# Patient Record
Sex: MALE | Race: BLACK OR AFRICAN AMERICAN | NOT HISPANIC OR LATINO | URBAN - METROPOLITAN AREA
[De-identification: names, ages, dates, MRNs, and addresses within clinical notes are randomized per-mention and may not be internally consistent; named-entity substitution may affect disease eponyms.]

---

## 2021-04-10 ENCOUNTER — INPATIENT (INPATIENT)
Facility: HOSPITAL | Age: 81
LOS: 2 days | Discharge: HOME | DRG: 70 | End: 2021-04-13
Attending: INTERNAL MEDICINE | Admitting: INTERNAL MEDICINE
Payer: MEDICARE

## 2021-04-10 VITALS
TEMPERATURE: 98 F | RESPIRATION RATE: 18 BRPM | HEART RATE: 81 BPM | WEIGHT: 125 LBS | DIASTOLIC BLOOD PRESSURE: 86 MMHG | HEIGHT: 65 IN | SYSTOLIC BLOOD PRESSURE: 136 MMHG

## 2021-04-10 DIAGNOSIS — D49.6 NEOPLASM OF UNSPECIFIED BEHAVIOR OF BRAIN: ICD-10-CM

## 2021-04-10 LAB
ALBUMIN SERPL ELPH-MCNC: 3.3 G/DL — SIGNIFICANT CHANGE UP (ref 3.3–5)
ALP SERPL-CCNC: 133 U/L — HIGH (ref 40–120)
ALT FLD-CCNC: 6 U/L — LOW (ref 10–45)
ANION GAP SERPL CALC-SCNC: 11 MMOL/L — SIGNIFICANT CHANGE UP (ref 5–17)
APPEARANCE UR: CLEAR — SIGNIFICANT CHANGE UP
APTT BLD: 32.5 SEC — SIGNIFICANT CHANGE UP (ref 27.5–35.5)
AST SERPL-CCNC: 19 U/L — SIGNIFICANT CHANGE UP (ref 10–40)
BACTERIA # UR AUTO: NEGATIVE — SIGNIFICANT CHANGE UP
BASOPHILS # BLD AUTO: 0.01 K/UL — SIGNIFICANT CHANGE UP (ref 0–0.2)
BASOPHILS NFR BLD AUTO: 0.1 % — SIGNIFICANT CHANGE UP (ref 0–2)
BILIRUB SERPL-MCNC: 0.2 MG/DL — SIGNIFICANT CHANGE UP (ref 0.2–1.2)
BILIRUB UR-MCNC: NEGATIVE — SIGNIFICANT CHANGE UP
BLD GP AB SCN SERPL QL: NEGATIVE — SIGNIFICANT CHANGE UP
BUN SERPL-MCNC: 23 MG/DL — SIGNIFICANT CHANGE UP (ref 7–23)
CALCIUM SERPL-MCNC: 9.1 MG/DL — SIGNIFICANT CHANGE UP (ref 8.4–10.5)
CHLORIDE SERPL-SCNC: 104 MMOL/L — SIGNIFICANT CHANGE UP (ref 96–108)
CO2 SERPL-SCNC: 23 MMOL/L — SIGNIFICANT CHANGE UP (ref 22–31)
COLOR SPEC: COLORLESS — SIGNIFICANT CHANGE UP
CREAT SERPL-MCNC: 1.11 MG/DL — SIGNIFICANT CHANGE UP (ref 0.5–1.3)
DIFF PNL FLD: ABNORMAL
EOSINOPHIL # BLD AUTO: 0.06 K/UL — SIGNIFICANT CHANGE UP (ref 0–0.5)
EOSINOPHIL NFR BLD AUTO: 0.8 % — SIGNIFICANT CHANGE UP (ref 0–6)
EPI CELLS # UR: 1 /HPF — SIGNIFICANT CHANGE UP
ETHANOL SERPL-MCNC: SIGNIFICANT CHANGE UP MG/DL (ref 0–10)
GLUCOSE SERPL-MCNC: 111 MG/DL — HIGH (ref 70–99)
GLUCOSE UR QL: NEGATIVE — SIGNIFICANT CHANGE UP
HCT VFR BLD CALC: 35.2 % — LOW (ref 39–50)
HGB BLD-MCNC: 10.7 G/DL — LOW (ref 13–17)
HYALINE CASTS # UR AUTO: 0 /LPF — SIGNIFICANT CHANGE UP (ref 0–2)
IMM GRANULOCYTES NFR BLD AUTO: 0.5 % — SIGNIFICANT CHANGE UP (ref 0–1.5)
INR BLD: 1.04 RATIO — SIGNIFICANT CHANGE UP (ref 0.88–1.16)
KETONES UR-MCNC: NEGATIVE — SIGNIFICANT CHANGE UP
LEUKOCYTE ESTERASE UR-ACNC: NEGATIVE — SIGNIFICANT CHANGE UP
LYMPHOCYTES # BLD AUTO: 2.37 K/UL — SIGNIFICANT CHANGE UP (ref 1–3.3)
LYMPHOCYTES # BLD AUTO: 31.6 % — SIGNIFICANT CHANGE UP (ref 13–44)
MCHC RBC-ENTMCNC: 28.4 PG — SIGNIFICANT CHANGE UP (ref 27–34)
MCHC RBC-ENTMCNC: 30.4 GM/DL — LOW (ref 32–36)
MCV RBC AUTO: 93.4 FL — SIGNIFICANT CHANGE UP (ref 80–100)
MONOCYTES # BLD AUTO: 0.58 K/UL — SIGNIFICANT CHANGE UP (ref 0–0.9)
MONOCYTES NFR BLD AUTO: 7.7 % — SIGNIFICANT CHANGE UP (ref 2–14)
NEUTROPHILS # BLD AUTO: 4.45 K/UL — SIGNIFICANT CHANGE UP (ref 1.8–7.4)
NEUTROPHILS NFR BLD AUTO: 59.3 % — SIGNIFICANT CHANGE UP (ref 43–77)
NITRITE UR-MCNC: NEGATIVE — SIGNIFICANT CHANGE UP
NRBC # BLD: 0 /100 WBCS — SIGNIFICANT CHANGE UP (ref 0–0)
PCP SPEC-MCNC: SIGNIFICANT CHANGE UP
PH UR: 7 — SIGNIFICANT CHANGE UP (ref 5–8)
PLATELET # BLD AUTO: 218 K/UL — SIGNIFICANT CHANGE UP (ref 150–400)
POTASSIUM SERPL-MCNC: 4.8 MMOL/L — SIGNIFICANT CHANGE UP (ref 3.5–5.3)
POTASSIUM SERPL-SCNC: 4.8 MMOL/L — SIGNIFICANT CHANGE UP (ref 3.5–5.3)
PROT SERPL-MCNC: 7.6 G/DL — SIGNIFICANT CHANGE UP (ref 6–8.3)
PROT UR-MCNC: NEGATIVE — SIGNIFICANT CHANGE UP
PROTHROM AB SERPL-ACNC: 12.4 SEC — SIGNIFICANT CHANGE UP (ref 10.6–13.6)
RBC # BLD: 3.77 M/UL — LOW (ref 4.2–5.8)
RBC # FLD: 14.6 % — HIGH (ref 10.3–14.5)
RBC CASTS # UR COMP ASSIST: 4 /HPF — SIGNIFICANT CHANGE UP (ref 0–4)
RH IG SCN BLD-IMP: POSITIVE — SIGNIFICANT CHANGE UP
SARS-COV-2 RNA SPEC QL NAA+PROBE: SIGNIFICANT CHANGE UP
SODIUM SERPL-SCNC: 138 MMOL/L — SIGNIFICANT CHANGE UP (ref 135–145)
SP GR SPEC: 1.02 — SIGNIFICANT CHANGE UP (ref 1.01–1.02)
UROBILINOGEN FLD QL: NEGATIVE — SIGNIFICANT CHANGE UP
WBC # BLD: 7.51 K/UL — SIGNIFICANT CHANGE UP (ref 3.8–10.5)
WBC # FLD AUTO: 7.51 K/UL — SIGNIFICANT CHANGE UP (ref 3.8–10.5)
WBC UR QL: 4 /HPF — SIGNIFICANT CHANGE UP (ref 0–5)

## 2021-04-10 PROCEDURE — 99285 EMERGENCY DEPT VISIT HI MDM: CPT

## 2021-04-10 PROCEDURE — 71260 CT THORAX DX C+: CPT | Mod: 26,MA

## 2021-04-10 PROCEDURE — 74177 CT ABD & PELVIS W/CONTRAST: CPT | Mod: 26,MA

## 2021-04-10 RX ORDER — LEVETIRACETAM 250 MG/1
500 TABLET, FILM COATED ORAL
Refills: 0 | Status: DISCONTINUED | OUTPATIENT
Start: 2021-04-10 | End: 2021-04-13

## 2021-04-10 RX ORDER — SODIUM CHLORIDE 9 MG/ML
1000 INJECTION INTRAMUSCULAR; INTRAVENOUS; SUBCUTANEOUS ONCE
Refills: 0 | Status: COMPLETED | OUTPATIENT
Start: 2021-04-10 | End: 2021-04-10

## 2021-04-10 RX ORDER — PANTOPRAZOLE SODIUM 20 MG/1
40 TABLET, DELAYED RELEASE ORAL
Refills: 0 | Status: DISCONTINUED | OUTPATIENT
Start: 2021-04-10 | End: 2021-04-13

## 2021-04-10 RX ORDER — DEXAMETHASONE 0.5 MG/5ML
4 ELIXIR ORAL EVERY 6 HOURS
Refills: 0 | Status: DISCONTINUED | OUTPATIENT
Start: 2021-04-10 | End: 2021-04-10

## 2021-04-10 RX ORDER — DEXAMETHASONE 0.5 MG/5ML
4 ELIXIR ORAL EVERY 6 HOURS
Refills: 0 | Status: DISCONTINUED | OUTPATIENT
Start: 2021-04-10 | End: 2021-04-13

## 2021-04-10 RX ADMIN — Medication 4 MILLIGRAM(S): at 17:56

## 2021-04-10 RX ADMIN — SODIUM CHLORIDE 1000 MILLILITER(S): 9 INJECTION INTRAMUSCULAR; INTRAVENOUS; SUBCUTANEOUS at 10:11

## 2021-04-10 RX ADMIN — LEVETIRACETAM 500 MILLIGRAM(S): 250 TABLET, FILM COATED ORAL at 17:56

## 2021-04-10 NOTE — ED PROVIDER NOTE - ATTENDING CONTRIBUTION TO CARE
Attending MD Layla Correa:  I personally have seen and examined this patient.  Resident note reviewed and agree on plan of care and except where noted.  See HPI, PE, and MDM for details.

## 2021-04-10 NOTE — ED PROVIDER NOTE - PROGRESS NOTE DETAILS
Attending Layla Correa: d/w neurosurgery will come to evaluate [place]: patient with Lung lesion on CT concerning for metastatic malignancy.   NeuroSx recommending medicine admission for malignancy w/u-admitted to Dr. Higgins. Attending Layla Correa: ct scans concrning for malignancy, d/w neurosurgery will admit to medicine

## 2021-04-10 NOTE — ED PROVIDER NOTE - CLINICAL SUMMARY MEDICAL DECISION MAKING FREE TEXT BOX
Attending Layla Correa: 81 y/o male transferred from [facility] for concern for frontal mass with edema and shift. received keppra and decdron prior to arrival. upon arrival to the ed pt awake and alert moving extremities. will d/w neurosurgery. malignancy work up.

## 2021-04-10 NOTE — CONSULT NOTE ADULT - ASSESSMENT
FERMINALISONENDER    79YO M no PMHx (does not see doctors), presents as xfer after first time GTC [place] a [place], after was post-ictal with lip biting per report. Got dex and keppra. CTH uploaded from OSH - L frontal approx 3cm mass with extensive vasogenic edema and 2-3mm MLS. 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 Exam: EO, AOx2, FCx4, difficulty with complex commands and object naming, EOMI, no facial, FLORES 5/5, no drift.   - CT CAP+C pending  - continue neuro checks  - basic labs, tox screen and BAL pending  - Continue keppra 500 BID  - hold further steroids pending imaging given improvement in exam  - dispo pending CT CAP r/o metastatic disease  - Will need MRI wwo contrast to evaluate lesion

## 2021-04-10 NOTE — CHART NOTE - NSCHARTNOTEFT_GEN_A_CORE
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 depending on prognosis and GOC per family, can consider SRS to L frontal brain met pending MRI imaging.  - continue q4h neuro checks  - basic labs wnl, tox screen and BAL neg  - Continue keppra 500 BID  - Can start dex 4q6  - Will need MRI wwo contrast to evaluate lesion

## 2021-04-10 NOTE — ED ADULT NURSE NOTE - OBJECTIVE STATEMENT
80 yr old male pt with no PMH BIBEMS from [facility] for a known brain mass with midline shift from a CT of the brain. Pt was out at a casino and around 3 AM had a 3-4 min witnessed tonic/clonic seizure. 80 yr old male pt with no PMH BIBEMS from [facility] for a known brain mass with midline shift from a CT of the brain. Pt was out at a casino and around 3 AM had a 3-4 min witnessed tonic/clonic seizure. EMS states that they gave dex, haldol and Keppra at [place]. Pt denies any SOB, fever, chills, n/v/d, CP or trouble urinating at current time. Pt oriented to self and place, but disoriented to year. VSS. Pts neuro intact. B/L upper and lower extremities strong. B/L pupils 2mm and reactive. Pt passed his Dysphagia screen. Pts skin intact and normal for race. Lung sounds clear b/l. Abd soft to touch with normoactive bowel sounds in all four quadrants. PIv placed. labs sent. Pt placed on monitor. Safety maintained. Will continue to monitor

## 2021-04-10 NOTE — ED PROVIDER NOTE - OBJECTIVE STATEMENT
81 y/o M no sig PMH presenting from OSH w CNS lesion.    Patient had witnessed seizure at a casino lasting approximately 3 min w GTC activity. As per EMS, patietn was post-ictal subsequently with lip biting.   CTH at OSH showing 3 cm mass with surrounding edema and midline shift.   Denies any N/V, headache or vision changes.

## 2021-04-10 NOTE — CONSULT NOTE ADULT - SUBJECTIVE AND OBJECTIVE BOX
p ([id number])     HPI:  79 y/o M no sig PMH presenting from OSH w CNS lesion.  	Patient had witnessed seizure at a casino lasting approximately 3 min w GTC activity. As per EMS, harisn was post-ictal subsequently with lip biting.   CTH at OSH showing 3 cm mass with surrounding edema and midline shift. S/p decadron and keppra.     Imaging:  CTH uploaded from OSH - L frontal approx 3cm mass with extensive vasogenic edema and 2-3mm MLS    Exam:  EO, AOx2, FCx4, difficulty with complex commands and object naming, EOMI, no facial, FLORES 5/5, no drift.     --Anticoagulation:    =====================  PAST MEDICAL HISTORY   No pertinent past medical history      PAST SURGICAL HISTORY   No significant past surgical history          MEDICATIONS:  Antibiotics:    Neuro:    Other:      SOCIAL HISTORY:   Occupation:   Marital Status:     FAMILY HISTORY:      ROS: Negative except per HPI    LABS:

## 2021-04-10 NOTE — ED ADULT NURSE NOTE - NSIMPLEMENTINTERV_GEN_ALL_ED
Implemented All Universal Safety Interventions:  [place] to call system. Call bell, personal items and telephone within reach. Instruct patient to call for assistance. Room bathroom lighting operational. Non-slip footwear when patient is off stretcher. Physically safe environment: no spills, clutter or unnecessary equipment. Stretcher in lowest position, wheels locked, appropriate side rails in place.

## 2021-04-10 NOTE — H&P ADULT - ASSESSMENT
The patient is a 80y Male was transfer because of brain mass.    Brain mass:  ? Mets  Neuro Sx eval appreciated  Keppra  IV Decadron    Lung mass:    IR for biopsy    Will discuss with family for GOC The patient is a 80y Male was transfer because of brain mass.    Brain mass:  ? Mets  Neuro Sx eval appreciated  Keppra  IV Decadron  MRI brain pending    Lung mass:    IR for biopsy    Will discuss with family for GOC

## 2021-04-10 NOTE — H&P ADULT - HISTORY OF PRESENT ILLNESS
79 y/o M no sig PMH presenting from OSH w CNS lesion.  Patient had witnessed seizure at a casino lasting approximately 3 min w GTC activity. As per EMS, patient  was post-ictal subsequently with lip biting.   CTH at OSH showing 3 cm mass with surrounding edema and midline shift.   Denies any N/V, headache or vision changes.

## 2021-04-10 NOTE — ED PROVIDER NOTE - PHYSICAL EXAMINATION
Attending Layla Correa: Gen: NAD, heent: atrauamtic, eomi, perrla, mmm, op pink, uvula midline, neck; nttp, no nuchal rigidity, chest: nttp, no crepitus, cv: rrr, no murmurs, lungs: ctab, abd: soft, nontender, nondistended, no peritoneal signs, +BS, no guarding, ext: wwp, neg homans, skin: no rash, neuro: awake and alert, following commands, speech clear, sensation and strength intact, no focal deficits

## 2021-04-11 LAB
ANION GAP SERPL CALC-SCNC: 12 MMOL/L — SIGNIFICANT CHANGE UP (ref 5–17)
BUN SERPL-MCNC: 16 MG/DL — SIGNIFICANT CHANGE UP (ref 7–23)
CALCIUM SERPL-MCNC: 8.4 MG/DL — SIGNIFICANT CHANGE UP (ref 8.4–10.5)
CHLORIDE SERPL-SCNC: 101 MMOL/L — SIGNIFICANT CHANGE UP (ref 96–108)
CO2 SERPL-SCNC: 24 MMOL/L — SIGNIFICANT CHANGE UP (ref 22–31)
COVID-19 SPIKE DOMAIN AB INTERP: NEGATIVE — SIGNIFICANT CHANGE UP
COVID-19 SPIKE DOMAIN ANTIBODY RESULT: 0.4 U/ML — SIGNIFICANT CHANGE UP
CREAT SERPL-MCNC: 0.79 MG/DL — SIGNIFICANT CHANGE UP (ref 0.5–1.3)
GLUCOSE SERPL-MCNC: 113 MG/DL — HIGH (ref 70–99)
HCT VFR BLD CALC: 38.6 % — LOW (ref 39–50)
HGB BLD-MCNC: 11.9 G/DL — LOW (ref 13–17)
MCHC RBC-ENTMCNC: 28.4 PG — SIGNIFICANT CHANGE UP (ref 27–34)
MCHC RBC-ENTMCNC: 30.8 GM/DL — LOW (ref 32–36)
MCV RBC AUTO: 92.1 FL — SIGNIFICANT CHANGE UP (ref 80–100)
NRBC # BLD: 0 /100 WBCS — SIGNIFICANT CHANGE UP (ref 0–0)
PLATELET # BLD AUTO: 267 K/UL — SIGNIFICANT CHANGE UP (ref 150–400)
POTASSIUM SERPL-MCNC: 4.3 MMOL/L — SIGNIFICANT CHANGE UP (ref 3.5–5.3)
POTASSIUM SERPL-SCNC: 4.3 MMOL/L — SIGNIFICANT CHANGE UP (ref 3.5–5.3)
RBC # BLD: 4.19 M/UL — LOW (ref 4.2–5.8)
RBC # FLD: 14.4 % — SIGNIFICANT CHANGE UP (ref 10.3–14.5)
SARS-COV-2 IGG+IGM SERPL QL IA: 0.4 U/ML — SIGNIFICANT CHANGE UP
SARS-COV-2 IGG+IGM SERPL QL IA: NEGATIVE — SIGNIFICANT CHANGE UP
SODIUM SERPL-SCNC: 137 MMOL/L — SIGNIFICANT CHANGE UP (ref 135–145)
WBC # BLD: 9.94 K/UL — SIGNIFICANT CHANGE UP (ref 3.8–10.5)
WBC # FLD AUTO: 9.94 K/UL — SIGNIFICANT CHANGE UP (ref 3.8–10.5)

## 2021-04-11 RX ADMIN — Medication 4 MILLIGRAM(S): at 06:15

## 2021-04-11 RX ADMIN — PANTOPRAZOLE SODIUM 40 MILLIGRAM(S): 20 TABLET, DELAYED RELEASE ORAL at 06:15

## 2021-04-11 RX ADMIN — LEVETIRACETAM 500 MILLIGRAM(S): 250 TABLET, FILM COATED ORAL at 17:31

## 2021-04-11 RX ADMIN — LEVETIRACETAM 500 MILLIGRAM(S): 250 TABLET, FILM COATED ORAL at 06:15

## 2021-04-11 RX ADMIN — Medication 4 MILLIGRAM(S): at 00:28

## 2021-04-12 LAB
ALBUMIN SERPL ELPH-MCNC: 3.5 G/DL — SIGNIFICANT CHANGE UP (ref 3.3–5)
ALP SERPL-CCNC: 129 U/L — HIGH (ref 40–120)
ALT FLD-CCNC: 6 U/L — LOW (ref 10–45)
ANION GAP SERPL CALC-SCNC: 12 MMOL/L — SIGNIFICANT CHANGE UP (ref 5–17)
AST SERPL-CCNC: 16 U/L — SIGNIFICANT CHANGE UP (ref 10–40)
BILIRUB SERPL-MCNC: 0.2 MG/DL — SIGNIFICANT CHANGE UP (ref 0.2–1.2)
BUN SERPL-MCNC: 29 MG/DL — HIGH (ref 7–23)
CALCIUM SERPL-MCNC: 9.6 MG/DL — SIGNIFICANT CHANGE UP (ref 8.4–10.5)
CHLORIDE SERPL-SCNC: 101 MMOL/L — SIGNIFICANT CHANGE UP (ref 96–108)
CO2 SERPL-SCNC: 27 MMOL/L — SIGNIFICANT CHANGE UP (ref 22–31)
CREAT SERPL-MCNC: 1.07 MG/DL — SIGNIFICANT CHANGE UP (ref 0.5–1.3)
GLUCOSE SERPL-MCNC: 115 MG/DL — HIGH (ref 70–99)
HCT VFR BLD CALC: 38.7 % — LOW (ref 39–50)
HGB BLD-MCNC: 11.9 G/DL — LOW (ref 13–17)
MCHC RBC-ENTMCNC: 28.3 PG — SIGNIFICANT CHANGE UP (ref 27–34)
MCHC RBC-ENTMCNC: 30.7 GM/DL — LOW (ref 32–36)
MCV RBC AUTO: 91.9 FL — SIGNIFICANT CHANGE UP (ref 80–100)
NRBC # BLD: 0 /100 WBCS — SIGNIFICANT CHANGE UP (ref 0–0)
PLATELET # BLD AUTO: 270 K/UL — SIGNIFICANT CHANGE UP (ref 150–400)
POTASSIUM SERPL-MCNC: 4.3 MMOL/L — SIGNIFICANT CHANGE UP (ref 3.5–5.3)
POTASSIUM SERPL-SCNC: 4.3 MMOL/L — SIGNIFICANT CHANGE UP (ref 3.5–5.3)
PROT SERPL-MCNC: 8 G/DL — SIGNIFICANT CHANGE UP (ref 6–8.3)
RBC # BLD: 4.21 M/UL — SIGNIFICANT CHANGE UP (ref 4.2–5.8)
RBC # FLD: 14.4 % — SIGNIFICANT CHANGE UP (ref 10.3–14.5)
SODIUM SERPL-SCNC: 140 MMOL/L — SIGNIFICANT CHANGE UP (ref 135–145)
WBC # BLD: 12.38 K/UL — HIGH (ref 3.8–10.5)
WBC # FLD AUTO: 12.38 K/UL — HIGH (ref 3.8–10.5)

## 2021-04-12 RX ADMIN — Medication 4 MILLIGRAM(S): at 06:25

## 2021-04-12 RX ADMIN — Medication 4 MILLIGRAM(S): at 17:23

## 2021-04-12 RX ADMIN — Medication 4 MILLIGRAM(S): at 00:18

## 2021-04-12 RX ADMIN — LEVETIRACETAM 500 MILLIGRAM(S): 250 TABLET, FILM COATED ORAL at 17:22

## 2021-04-12 RX ADMIN — LEVETIRACETAM 500 MILLIGRAM(S): 250 TABLET, FILM COATED ORAL at 06:24

## 2021-04-12 RX ADMIN — PANTOPRAZOLE SODIUM 40 MILLIGRAM(S): 20 TABLET, DELAYED RELEASE ORAL at 06:25

## 2021-04-12 NOTE — PROGRESS NOTE ADULT - SUBJECTIVE AND OBJECTIVE BOX
Patient is a 80y old  Male who presents with a chief complaint of The patient is a 80y Male was transfer because of brain mass. (2021 08:11)      SUBJECTIVE / OVERNIGHT EVENTS:    Events noted.  CONSTITUTIONAL: Refused work up for brain/lung mass  RESPIRATORY: No cough, wheezing,  No shortness of breath  CARDIOVASCULAR: No chest pain, palpitations, dizziness, or leg swelling  GASTROINTESTINAL: No abdominal or epigastric pain. No nausea, vomiting.  NEUROLOGICAL: No headaches,     MEDICATIONS  (STANDING):  dexAMETHasone  Injectable 4 milliGRAM(s) IV Push every 6 hours  levETIRAcetam 500 milliGRAM(s) Oral two times a day  pantoprazole    Tablet 40 milliGRAM(s) Oral before breakfast    MEDICATIONS  (PRN):        CAPILLARY BLOOD GLUCOSE        I&O's Summary    10 Apr 2021 07:  -  2021 07:00  --------------------------------------------------------  IN: 360 mL / OUT: 300 mL / NET: 60 mL    2021 07:01  -  2021 23:58  --------------------------------------------------------  IN: 240 mL / OUT: 0 mL / NET: 240 mL        T(C): 36.6 (21 @ 20:06), Max: 36.7 (21 @ 04:21)  HR: 68 (21 @ 20:06) (68 - 82)  BP: 128/87 (21 @ 20:06) (128/87 - 138/89)  RR: 18 (21 @ 20:06) (17 - 18)  SpO2: 96% (21 @ 20:06) (94% - 96%)    PHYSICAL EXAM:    NECK: Supple, No JVD  CHEST/LUNG: Clear to auscultation bilaterally; No wheezing.  HEART: Regular rate and rhythm; No murmurs, rubs, or gallops  ABDOMEN: Soft, Nontender, Nondistended; Bowel sounds present  EXTREMITIES:   No edema  NEUROLOGY: AAO       LABS:                        11.9   9.94  )-----------( 267      ( 2021 07:56 )             38.6     04    137  |  101  |  16  ----------------------------<  113<H>  4.3   |  24  |  0.79    Ca    8.4      2021 07:50    TPro  7.6  /  Alb  3.3  /  TBili  0.2  /  DBili  x   /  AST  19  /  ALT  6<L>  /  AlkPhos  133<H>  04-10    PT/INR - ( 10 Apr 2021 08:31 )   PT: 12.4 sec;   INR: 1.04 ratio         PTT - ( 10 Apr 2021 08:31 )  PTT:32.5 sec      Urinalysis Basic - ( 10 Apr 2021 10:18 )    Color: Colorless / Appearance: Clear / S.025 / pH: x  Gluc: x / Ketone: Negative  / Bili: Negative / Urobili: Negative   Blood: x / Protein: Negative / Nitrite: Negative   Leuk Esterase: Negative / RBC: 4 /hpf / WBC 4 /HPF   Sq Epi: x / Non Sq Epi: 1 /hpf / Bacteria: Negative      CAPILLARY BLOOD GLUCOSE            RADIOLOGY & ADDITIONAL TESTS:    Imaging Personally Reviewed:    Consultant(s) Notes Reviewed:      Care Discussed with Consultants/Other Providers:    Steve Higgins MD, CMD, FACP    [address] [place] [address]  Office Tel: [phone]  Cell: [phone]  
Patient is a 80y old  Male who presents with a chief complaint of The patient is a 80y Male was transfer because of brain mass. (12 Apr 2021 05:28)      SUBJECTIVE / OVERNIGHT EVENTS:    Events noted.  CONSTITUTIONAL: No fever,  or fatigue  RESPIRATORY: No cough, wheezing,  No shortness of breath  CARDIOVASCULAR: No chest pain, palpitations, dizziness, or leg swelling  GASTROINTESTINAL: No abdominal or epigastric pain. No nausea, vomiting.  NEUROLOGICAL: No headaches,     MEDICATIONS  (STANDING):  dexAMETHasone  Injectable 4 milliGRAM(s) IV Push every 6 hours  levETIRAcetam 500 milliGRAM(s) Oral two times a day  pantoprazole    Tablet 40 milliGRAM(s) Oral before breakfast    MEDICATIONS  (PRN):        CAPILLARY BLOOD GLUCOSE        I&O's Summary    11 Apr 2021 07:01  -  12 Apr 2021 07:00  --------------------------------------------------------  IN: 240 mL / OUT: 0 mL / NET: 240 mL    12 Apr 2021 07:01  -  13 Apr 2021 00:44  --------------------------------------------------------  IN: 640 mL / OUT: 0 mL / NET: 640 mL        T(C): 36.6 (04-12-21 @ 21:46), Max: 36.6 (04-12-21 @ 21:46)  HR: 68 (04-12-21 @ 21:46) (68 - 72)  BP: 141/90 (04-12-21 @ 21:46) (127/81 - 145/97)  RR: 18 (04-12-21 @ 21:46) (18 - 18)  SpO2: 96% (04-12-21 @ 21:46) (96% - 97%)    PHYSICAL EXAM:  GENERAL: NAD  NECK: Supple, No JVD  CHEST/LUNG: Clear to auscultation bilaterally; No wheezing.  HEART: Regular rate and rhythm; No murmurs, rubs, or gallops  ABDOMEN: Soft, Nontender, Nondistended; Bowel sounds present  EXTREMITIES:   No edema  NEUROLOGY: AAO X 3      LABS:                        11.9   12.38 )-----------( 270      ( 12 Apr 2021 07:44 )             38.7     04-12    140  |  101  |  29<H>  ----------------------------<  115<H>  4.3   |  27  |  1.07    Ca    9.6      12 Apr 2021 07:44    TPro  8.0  /  Alb  3.5  /  TBili  0.2  /  DBili  x   /  AST  16  /  ALT  6<L>  /  AlkPhos  129<H>  04-12            CAPILLARY BLOOD GLUCOSE            RADIOLOGY & ADDITIONAL TESTS:    Imaging Personally Reviewed:    Consultant(s) Notes Reviewed:      Care Discussed with Consultants/Other Providers:    Steve Higgins MD, CMD, FACP    [address] [place] [address]  Office Tel: [phone]  Cell: [phone]  
Patient seen and examined at bedside.    --Anticoagulation--    T(C): 36.7 (04-11-21 @ 04:21), Max: 36.7 (04-10-21 @ 11:50)  HR: 75 (04-11-21 @ 04:21) (60 - 87)  BP: 138/89 (04-11-21 @ 04:21) (114/73 - 143/85)  RR: 18 (04-11-21 @ 04:21) (18 - 18)  SpO2: 94% (04-11-21 @ 04:21) (94% - 100%)  Wt(kg): --    Exam:  EO, AOx2, FCx4, difficulty with complex commands and object naming, EOMI, no facial, FLORES 5/5, no drift
Patient seen and examined at bedside.    --Anticoagulation--    T(C): 36.4 (04-12-21 @ 05:09), Max: 36.7 (04-11-21 @ 13:12)  HR: 72 (04-12-21 @ 05:09) (68 - 82)  BP: 127/81 (04-12-21 @ 05:09) (127/81 - 132/76)  RR: 18 (04-12-21 @ 05:09) (17 - 18)  SpO2: 97% (04-12-21 @ 05:09) (94% - 97%)  Wt(kg): --    Exam:  EO, AOx2, FCx4, difficulty with complex commands and object naming, EOMI, no facial, FLORES 5/5, no drift    No new imaging    labs:                        11.9   9.94  )-----------( 267      ( 11 Apr 2021 07:56 )             38.6   04-11    137  |  101  |  16  ----------------------------<  113<H>  4.3   |  24  |  0.79    Ca    8.4      11 Apr 2021 07:50    TPro  7.6  /  Alb  3.3  /  TBili  0.2  /  DBili  x   /  AST  19  /  ALT  6<L>  /  AlkPhos  133<H>  04-10

## 2021-04-12 NOTE — PROGRESS NOTE ADULT - REASON FOR ADMISSION
The patient is a 80y Male was transfer because of brain mass.

## 2021-04-12 NOTE — PROGRESS NOTE ADULT - ASSESSMENT
81YO M no PMHx (does not see doctors), presents as xfer after first [place], after was post-ictal with lip biting per report. Got dex and keppra. CTH uploaded from OSH - L frontal approx 3cm mass with extensive vasogenic edema and 2-3mm MLS. 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 Exam: EO, AOx2, FCx4, difficulty with complex commands and object naming, EOMI, no facial, FLORES 5/5, no drift.   - Admitted to medicine  - CT CAP+C showed MAEVE spiculated lung mass c/f lung primary ca w/ diffuse LAD and adrenal mets  - Will need meds w/u, IR consult for lung biopsy, and med/onc. depending on prognosis and GOC per family, can consider SRS to L frontal brain met pending MRI imaging.  - continue q4h neuro checks  - basic labs wnl, tox screen and BAL neg  - Continue keppra 500 BID  - Can start dex 4q6  - Will need MRI brain wwo contrast to evaluate lesion   - Patient refusing w/u for mass or further w/u for metastatic disease. Will finalize plan with primary/family today.
ENDER PEREZ    79YO M no PMHx (does not see doctors), presents as xfer after first time [place], after was post-ictal with lip biting per report. Got dex and keppra. CTH uploaded from OSH - L frontal approx 3cm mass with extensive vasogenic edema and 2-3mm MLS. 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 Exam: EO, AOx2, FCx4, difficulty with complex commands and object naming, EOMI, no facial, FLORES 5/5, no drift.    - Admitted to medicine  - CT CAP+C showed MAEVE spiculated lung mass c/f lung primary ca w/ diffuse LAD and adrenal mets  - Will need meds w/u, IR consult for lung biopsy, and med/onc. depending on prognosis and GOC per family, can consider SRS to L frontal brain met pending MRI imaging.  - continue q4h neuro checks  - basic labs wnl, tox screen and BAL neg  - Continue keppra 500 BID  - Can start dex 4q6  - Will need MRI wwo contrast to evaluate lesion   
The patient is a 80y Male was transfer because of brain mass.    Brain mass:  ? Mets  Neuro Sx f/up appreciated  Keppra  IV Decadron  MRI brain refused    Lung mass:    Dw pt/Grand son and grand daughter.  Pt is refusing work up for mass  Dw pt's grandson again today.    DC planning tomorrow  
The patient is a 80y Male was transfer because of brain mass.    Brain mass:  ? Mets  Neuro Sx eval appreciated  Keppra  IV Decadron  MRI brain pending    Lung mass:    Dw pt/Grand son and grand daughter.  Pt is refusing work up for mass  Family is aware of that.

## 2021-04-13 VITALS
OXYGEN SATURATION: 95 % | RESPIRATION RATE: 18 BRPM | HEART RATE: 94 BPM | DIASTOLIC BLOOD PRESSURE: 87 MMHG | SYSTOLIC BLOOD PRESSURE: 139 MMHG | TEMPERATURE: 98 F

## 2021-04-13 PROCEDURE — 85025 COMPLETE CBC W/AUTO DIFF WBC: CPT

## 2021-04-13 PROCEDURE — 86850 RBC ANTIBODY SCREEN: CPT

## 2021-04-13 PROCEDURE — 86901 BLOOD TYPING SEROLOGIC RH(D): CPT

## 2021-04-13 PROCEDURE — 85610 PROTHROMBIN TIME: CPT

## 2021-04-13 PROCEDURE — U0003: CPT

## 2021-04-13 PROCEDURE — 82962 GLUCOSE BLOOD TEST: CPT

## 2021-04-13 PROCEDURE — U0005: CPT

## 2021-04-13 PROCEDURE — 81001 URINALYSIS AUTO W/SCOPE: CPT

## 2021-04-13 PROCEDURE — 96374 THER/PROPH/DIAG INJ IV PUSH: CPT

## 2021-04-13 PROCEDURE — 80307 DRUG TEST PRSMV CHEM ANLYZR: CPT

## 2021-04-13 PROCEDURE — 80048 BASIC METABOLIC PNL TOTAL CA: CPT

## 2021-04-13 PROCEDURE — 71260 CT THORAX DX C+: CPT

## 2021-04-13 PROCEDURE — 86769 SARS-COV-2 COVID-19 ANTIBODY: CPT

## 2021-04-13 PROCEDURE — 85730 THROMBOPLASTIN TIME PARTIAL: CPT

## 2021-04-13 PROCEDURE — 85027 COMPLETE CBC AUTOMATED: CPT

## 2021-04-13 PROCEDURE — 99285 EMERGENCY DEPT VISIT HI MDM: CPT | Mod: 25

## 2021-04-13 PROCEDURE — 74177 CT ABD & PELVIS W/CONTRAST: CPT

## 2021-04-13 PROCEDURE — 86900 BLOOD TYPING SEROLOGIC ABO: CPT

## 2021-04-13 PROCEDURE — 80053 COMPREHEN METABOLIC PANEL: CPT

## 2021-04-13 RX ORDER — PANTOPRAZOLE SODIUM 20 MG/1
1 TABLET, DELAYED RELEASE ORAL
Qty: 0 | Refills: 0 | DISCHARGE
Start: 2021-04-13

## 2021-04-13 RX ORDER — LEVETIRACETAM 250 MG/1
1 TABLET, FILM COATED ORAL
Qty: 0 | Refills: 0 | DISCHARGE
Start: 2021-04-13

## 2021-04-13 RX ORDER — HYDRALAZINE HCL 50 MG
5 TABLET ORAL ONCE
Refills: 0 | Status: DISCONTINUED | OUTPATIENT
Start: 2021-04-13 | End: 2021-04-13

## 2021-04-13 RX ORDER — AMLODIPINE BESYLATE 2.5 MG/1
1 TABLET ORAL
Qty: 30 | Refills: 0
Start: 2021-04-13 | End: 2021-05-12

## 2021-04-13 RX ORDER — HYDRALAZINE HCL 50 MG
2.5 TABLET ORAL ONCE
Refills: 0 | Status: COMPLETED | OUTPATIENT
Start: 2021-04-13 | End: 2021-04-13

## 2021-04-13 RX ORDER — LEVETIRACETAM 250 MG/1
1 TABLET, FILM COATED ORAL
Qty: 60 | Refills: 0
Start: 2021-04-13 | End: 2021-05-12

## 2021-04-13 RX ORDER — PANTOPRAZOLE SODIUM 20 MG/1
1 TABLET, DELAYED RELEASE ORAL
Qty: 30 | Refills: 0
Start: 2021-04-13 | End: 2021-05-12

## 2021-04-13 RX ADMIN — Medication 4 MILLIGRAM(S): at 12:32

## 2021-04-13 RX ADMIN — Medication 4 MILLIGRAM(S): at 07:29

## 2021-04-13 RX ADMIN — LEVETIRACETAM 500 MILLIGRAM(S): 250 TABLET, FILM COATED ORAL at 07:29

## 2021-04-13 RX ADMIN — Medication 2.5 MILLIGRAM(S): at 07:29

## 2021-04-13 RX ADMIN — PANTOPRAZOLE SODIUM 40 MILLIGRAM(S): 20 TABLET, DELAYED RELEASE ORAL at 07:28

## 2021-04-13 NOTE — DISCHARGE NOTE PROVIDER - NSDCMRMEDTOKEN_GEN_ALL_CORE_FT
levETIRAcetam 500 mg oral tablet: 1 tab(s) orally 2 times a day  Norvasc 2.5 mg oral tablet: 1 tab(s) orally once a day   pantoprazole 40 mg oral delayed release tablet: 1 tab(s) orally once a day (before a meal)

## 2021-04-13 NOTE — PROVIDER CONTACT NOTE (OTHER) - RECOMMENDATIONS
Continue to monitor pt, continue with safety precautions, continue with providers orders
Continue to monitor pt, continue with safety precautions, continue with providers orders

## 2021-04-13 NOTE — DISCHARGE NOTE NURSING/CASE MANAGEMENT/SOCIAL WORK - PATIENT PORTAL LINK FT
You can access the FollowMyHealth Patient Portal offered by [facility] by registering at the following website: [email or web address]. By joining [employer]’s FollowMyHealth portal, you will also be able to view your health information using other applications (apps) compatible with our system.

## 2021-04-13 NOTE — PROVIDER CONTACT NOTE (OTHER) - SITUATION
80 year old male pt who is alert, but confused- refusing dexamethasone IVP (midnight ordered dose)
80 year old male pt whos BP is 174/103 via electronically, and 172/90 manually

## 2021-04-13 NOTE — PROVIDER CONTACT NOTE (OTHER) - ASSESSMENT
Pt is currently laying in bed - showing no signs of acute distress or pain. Pt respirations are 18.
Pt is currently laying in bed - showing no signs of acute distress or pain. Pt denies chest pain. Pt respirations are 18

## 2021-04-13 NOTE — DISCHARGE NOTE PROVIDER - HOSPITAL COURSE
79 y/o M no sig PMH presenting from OSH w CNS lesion.  Patient [place] lasting [place] 3 min w GTC activity. As per EMS, patient  was post-ictal subsequently with lip biting.   CTH at OSH showing 3 cm mass with surrounding edema and midline shift.   Denies any N/V, headache or vision changes.  - Will need meds w/u, IR consult for lung biopsy, and med/onc. depending on prognosis and GOC per family, can consider SRS to L frontal brain met pending MRI imaging.  The patient is currently refusing any further work up, medications. The family is in agreement with whatever the patient wants. They are all aware that this is serious and may in fact be cancer. The patient and family are still declining any further workup and would like the patient discharged.

## 2021-04-13 NOTE — DISCHARGE NOTE NURSING/CASE MANAGEMENT/SOCIAL WORK - NSDCPNINST_GEN_ALL_CORE
call md for any discomforts felt--safety measures emphasized to patient --  call md for any discomforts felt--safety measures emphasized to patient -- advised him to always have somebody around to help him  with his needs-- patient claims he already had 2 doses of the covid-19 vaccine in [place]

## 2021-04-13 NOTE — DISCHARGE NOTE PROVIDER - NSDCCPCAREPLAN_GEN_ALL_CORE_FT
PRINCIPAL DISCHARGE DIAGNOSIS  Diagnosis: Brain tumor  Assessment and Plan of Treatment: pt refusing further work up and family in agreement. They understand this may be a cancer and are willing to accept that risk.      SECONDARY DISCHARGE DIAGNOSES  Diagnosis: HTN (hypertension)  Assessment and Plan of Treatment: Pt noted to by hypertensive during admission, will sent home on [facility].

## 2021-04-13 NOTE — DISCHARGE NOTE PROVIDER - CARE PROVIDER_API CALL
Steve Higgins)  Internal Medicine  [address] [facility], [address] [place] [address]  Phone: [phone]  Fax: [phone]  Follow Up Time:     Brenda Guadalupe)  Neurosurgery  [address], [address] [place] [id number]  Phone: [phone]  Fax: [phone]  Follow Up Time:

## 2021-04-13 NOTE — DISCHARGE NOTE PROVIDER - CARE PROVIDERS DIRECT ADDRESSES
[email or web address]

## 2022-09-07 NOTE — PROVIDER CONTACT NOTE (OTHER) - ACTION/TREATMENT ORDERED:
As per provider, continue to monitor pt, continue with safety precautions, continue with current orders (document that pt is refusing)
As per provider, continue to monitor pt, continue with safety precautions, await for upcoming orders
Yes